# Patient Record
Sex: FEMALE | Employment: FULL TIME | URBAN - METROPOLITAN AREA
[De-identification: names, ages, dates, MRNs, and addresses within clinical notes are randomized per-mention and may not be internally consistent; named-entity substitution may affect disease eponyms.]

---

## 2017-03-06 ENCOUNTER — THERAPY VISIT (OUTPATIENT)
Dept: OCCUPATIONAL THERAPY | Facility: CLINIC | Age: 42
End: 2017-03-06
Payer: COMMERCIAL

## 2017-03-06 DIAGNOSIS — M25.521 RIGHT ELBOW PAIN: ICD-10-CM

## 2017-03-06 DIAGNOSIS — M77.11 LATERAL EPICONDYLITIS OF RIGHT ELBOW: Primary | ICD-10-CM

## 2017-03-06 PROCEDURE — 97110 THERAPEUTIC EXERCISES: CPT | Mod: GO | Performed by: OCCUPATIONAL THERAPIST

## 2017-03-06 PROCEDURE — 97165 OT EVAL LOW COMPLEX 30 MIN: CPT | Mod: GO | Performed by: OCCUPATIONAL THERAPIST

## 2017-03-06 PROCEDURE — 97140 MANUAL THERAPY 1/> REGIONS: CPT | Mod: GO | Performed by: OCCUPATIONAL THERAPIST

## 2017-03-06 PROCEDURE — 97035 APP MDLTY 1+ULTRASOUND EA 15: CPT | Mod: GO | Performed by: OCCUPATIONAL THERAPIST

## 2017-03-06 NOTE — MR AVS SNAPSHOT
"              After Visit Summary   3/6/2017    Nia Kim    MRN: [id number]           Patient Information     Date Of Birth          1975        Visit Information        Provider Department      3/6/2017 7:30 AM Abbie Segovia, SEBASTIEN [facility]        Today's Diagnoses     Lateral epicondylitis of right elbow    -  1    Right elbow pain           Follow-ups after your visit        Your next 10 appointments already scheduled     Mar 15, 2017  7:00 AM CDT   JHONNY Hand with Abbie Segovia OT   [facility] ([facility]    [address]  [address]  [place] [address] [phone]            Mar 22, 2017  7:30 AM CDT   JHONNY Hand with Abbie Segovia, SEBASTIEN   [facility] ([facility]    [address]  [address]  [place] [id number] [phone]              Who to contact     If you have questions or need follow up information about today's clinic visit or your schedule please contact [facility] directly at [phone].  Normal or non-critical lab and imaging results will be communicated to you by [employer], letter or phone within 4 business days after the clinic has received the results. If you do not hear from us within 7 days, please contact the clinic through [employer] or phone. If you have a critical or abnormal lab result, we will notify you by phone as soon as possible.  Submit refill requests through [employer] or call your pharmacy and they will forward the refill request to us. Please allow 3 business days for your refill to be completed.          Additional Information About Your Visit        MyChart Information     [employer] lets you send messages to your doctor, view your test results, renew your prescriptions, schedule appointments and more. To sign up, go to [email or web address] . Click on \"Log in\" on the left side of the screen, which will take you to the Welcome page. Then click on \"Sign up Now\" on the " right side of the page.     You will be asked to enter the access code listed below, as well as some personal information. Please follow the directions to create your username and password.     Your access code is: [email or web address]  Expires: 2017 12:17 PM     Your access code will  in 90 days. If you need help or a new code, please call your [facility] clinic or [phone].        Care EveryWhere ID     This is your Care EveryWhere ID. This could be used by other organizations to access your [facility] medical records  [id number]         Blood Pressure from Last 3 Encounters:   No data found for BP    Weight from Last 3 Encounters:   No data found for Wt              We Performed the Following     HC OT EVAL, LOW COMPLEXITY     JHONNY INITIAL EVAL REPORT     MANUAL THER TECH,1+REGIONS,EA 15 MIN     THERAPEUTIC EXERCISES     ULTRASOUND THERAPY        Primary Care Provider    None Specified       No primary provider on file.        Thank you!     Thank you for choosing [facility]  for your care. Our goal is always to provide you with excellent care. Hearing back from our patients is one way we can continue to improve our services. Please take a few minutes to complete the written survey that you may receive in the mail after your visit with us. Thank you!             Your Updated Medication List - Protect others around you: Learn how to safely use, store and throw away your medicines at www.disposemymeds.org.      Notice  As of 3/6/2017 12:17 PM    You have not been prescribed any medications.

## 2017-03-06 NOTE — LETTER
[facility]  [address]  [address]  [facility] [address]  [phone]    2017    Re: Nia Kim   :   1975  MRN:  [id number]   REFERRING PHYSICIAN:   Tracey Borden    [facility]  Date of Initial Evaluation: 17  Visits:  Rxs Used: 1  Reason for Referral:     Lateral epicondylitis of right elbow  Right elbow pain    EVALUATION SUMMARY    Hand Therapy Initial Evaluation  Current Date:  3/6/2017  Referring Physician: MARSHA Farias    Subjective:  Nia Kim is a 42 year old right hand dominant female.  Diagnosis:Right Lateral Epicondylitis  DOI:  17    Patient reports symptoms of pain, stiffness/loss of motion, weakness/loss of strength, numbness and tingling of the right hand and elbow which occurred due to repetitive motions at work. Since onset symptoms are gradually getting worse. Special tests:  none.  Previous treatment: arm band with minimal benefit, (B) OTC wrist supports for CTS symptoms. General health as reported by patient is fair.      Pertinent medical history includes:overweight, high blood pressure, depression , smoking  Medical allergies:none.  Surgical history: none.  Medication history: pain, muscle relaxants, anti-depressants, high blood pressure.    Current occupation is   Currently working in normal job without restrictions  Job Tasks: computer work, other writing  Barriers include:none  Prior functional level:  no limitations  Red flags:  pain at rest/night    Additional Occupational Profile Information (patterns of daily living, interests, values and needs): work, household tasks (cooking, cleaning), lifting, sleeping    Functional Outcome Measure:   Upper Extremity Functional Index Score:  SCORE:   Column Totals: /80: 56   (A lower score indicates greater disability.)    Objective:   Pain:     VAS(0-10) 3/6/17   At Rest:  410   With Use:  7/10     Report of Pain:  Location: right elbow and hand    Description: Ache, Sharp, Throbbing Radiates from hand into the elbow  Frequency: Constant  Duration:  Worse in pm.  Exacerbated by: Lifting, gripping, twisting, pulling, activity  Relieved by:  ice, rest   Progression:  gradually worsening    ROM:  Elbow 3/6/17 3/6/17   AROM Right Left   Extension -20 0   Flexion 100 130   Supination 65 70   Pronation 90 90     Wrist 3/6/17 3/6/17   PROM Right Left   Extension 70 75   Flexion 55 65     Resisted Testing   +,++,+++ = pain  Right 3/6/17   Elbow Flexion-sup +++   Elbow Flexion-neutral ++   Elbow Flexion-pron +++   Elbow Extension ++   Pronation +++   Supination +++   Wrist Ext +++   Wrist Ext with RD +++   Wrist Ext with UD +++   MCPJ Extension IF, MF +++   MCPJ Extension MF ++   MCPJ Extension 2-5 +            Elbow Ext 3/6/17   Trials Right Left   1  2  3  Av 70      3/6/17   Trials Right Left   1  2  3  Av  23  21  21 80  68  70  73     Palpation:  Right 3/6/17   Lateral Epicondyle +++   PIN +++   Radial Head +     Assessment:  Patient presents with symptoms consistent with diagnosis of Lateral Epicondylitis, with conservative intervention.     Patient's limitations or Problem List includes:  Pain, Decreased ROM/motion, Weakness, Decreased  and Tightness in musculature of the right elbow which interferes with the patient's ability to perform Work Tasks, Sleep Patterns and Household Chores as compared to previous level of function.    Rehab Potential:  Excellent - Return to full activity, no limitations    Patient will benefit from skilled Occupational Therapy to increase ROM, flexibility,  strength and forearm strength and decrease pain to return to previous activity level and resume normal daily tasks and to reach their rehab potential.    Barriers to Learning:  No barrier    Communication Issues:  Patient appears to be able to clearly communicate and understand verbal and written communication and follow directions  correctly.    Assessment of Occupational Performance:  1-3 Performance Deficits  Identified Performance Deficits: home establishment and management, sleep and work      Clinical Decision Making (Complexity): Low complexity    Treatment Explanation:  The following has been discussed with the patient:    RX ordered/plan of care  Anticipated outcomes  Possible risks and side effects    Plan:  Frequency:  1 X week, once daily  Duration:  for 6 visits    Treatment Plan:    Modalities:  US and Iontophoresis   Therapeutic Exercise: AROM of elbow and wrist, PROM with stretch to wrist extensors and flexors,  ECRL strengthening progressing to ECRB strengthening (eccentric progressing to concentric ).   Manual Techniques: Radial head mobilization, transverse friction massage, myofascial release   Neuromuscular:  Nerve gliding, K taping  Orthotic Fabrication:  Wrist cock up orthosis, arm band     Discharge Plan:    Achieve all LTG.  Independent in home treatment program.  Reach maximal therapeutic benefit.    Home Program:   Warmth for stiffness  Ice after activity for pain  PROM with stretch to wrist extensors and flexors  TFM to LEP  MFR to extensors  Wrist cock up orthosis for sleeping  Elbow strap/arm band as needed with activities  Avoid activities that exacerbate pain in the elbow  Lift with forearms in neutral position    Next Visit:  US  ECRL strengthening    Thank you for your referral.    INQUIRIES  Therapist: SAIGE Lynch/L, CHT  [facility]  [address]  [address]  [facility] [address]  Phone: [phone]  Fax: [phone]

## 2017-03-06 NOTE — PROGRESS NOTES
Hand Therapy Initial Evaluation    Current Date:  3/6/2017  Referring Physician: MARSHA Farias    Subjective:  Nia Kim is a 42 year old right hand dominant female.    Diagnosis:Right Lateral Epicondylitis  DOI:  1/20/17    Patient reports symptoms of pain, stiffness/loss of motion, weakness/loss of strength, numbness and tingling of the right hand and elbow which occurred due to repetitive motions at work. Since onset symptoms are gradually getting worse. Special tests:  none.  Previous treatment: arm band with minimal benefit, (B) OTC wrist supports for CTS symptoms    General health as reported by patient is fair.  Pertinent medical history includes:overweight, high blood pressure, depression , smoking  Medical allergies:none.  Surgical history: none.  Medication history: pain, muscle relaxants, anti-depressants, high blood pressure.  Current occupation is   Currently working in normal job without restrictions  Job Tasks: computer work, other writing  Barriers include:none  Prior functional level:  no limitations  Red flags:  pain at rest/night    Additional Occupational Profile Information (patterns of daily living, interests, values and needs): work, household tasks (cooking, cleaning), lifting, sleeping    Functional Outcome Measure:   Upper Extremity Functional Index Score:  SCORE:   Column Totals: /80: 56   (A lower score indicates greater disability.)    Objective:   Pain:     VAS(0-10) 3/6/17   At Rest:  4/10   With Use:  7/10     Report of Pain:  Location: right elbow and hand   Description: Ache, Sharp, Throbbing Radiates from hand into the elbow  Frequency: Constant  Duration:  Worse in pm.  Exacerbated by: Lifting, gripping, twisting, pulling, activity  Relieved by:  ice, rest   Progression:  gradually worsening    ROM:  Elbow 3/6/17 3/6/17   AROM Right Left   Extension -20 0   Flexion 100 130   Supination 65 70   Pronation 90 90       Wrist 3/6/17 3/6/17   PROM Right Left    Extension 70 75   Flexion 55 65     Resisted Testing   +,++,+++ = pain  Right 3/6/17   Elbow Flexion-sup +++   Elbow Flexion-neutral ++   Elbow Flexion-pron +++   Elbow Extension ++   Pronation +++   Supination +++   Wrist Ext +++   Wrist Ext with RD +++   Wrist Ext with UD +++   MCPJ Extension IF, MF +++   MCPJ Extension MF ++   MCPJ Extension 2-5 +      Elbow Ext 3/6/17   Trials Right Left   1  2  3  Av 70      3/6/17   Trials Right Left   1  2  3  Av  23  21  21 80  68  70  73     Palpation:  Right 3/6/17   Lateral Epicondyle +++   PIN +++   Radial Head +       Assessment:  Patient presents with symptoms consistent with diagnosis of Lateral Epicondylitis, with conservative intervention.     Patient's limitations or Problem List includes:  Pain, Decreased ROM/motion, Weakness, Decreased  and Tightness in musculature of the right elbow which interferes with the patient's ability to perform Work Tasks, Sleep Patterns and Household Chores as compared to previous level of function.    Rehab Potential:  Excellent - Return to full activity, no limitations    Patient will benefit from skilled Occupational Therapy to increase ROM, flexibility,  strength and forearm strength and decrease pain to return to previous activity level and resume normal daily tasks and to reach their rehab potential.    Barriers to Learning:  No barrier    Communication Issues:  Patient appears to be able to clearly communicate and understand verbal and written communication and follow directions correctly.    Assessment of Occupational Performance:  1-3 Performance Deficits  Identified Performance Deficits: home establishment and management, sleep and work      Clinical Decision Making (Complexity): Low complexity      Treatment Explanation:  The following has been discussed with the patient:    RX ordered/plan of care  Anticipated outcomes  Possible risks and side effects    Plan:  Frequency:  1 X week, once  daily  Duration:  for 6 visits    Treatment Plan:    Modalities:  US and Iontophoresis   Therapeutic Exercise: AROM of elbow and wrist, PROM with stretch to wrist extensors and flexors,  ECRL strengthening progressing to ECRB strengthening (eccentric progressing to concentric ).   Manual Techniques: Radial head mobilization, transverse friction massage, myofascial release   Neuromuscular:  Nerve gliding, K taping  Orthotic Fabrication:  Wrist cock up orthosis, arm band     Discharge Plan:    Achieve all LTG.  Independent in home treatment program.  Reach maximal therapeutic benefit.    Home Program:   Warmth for stiffness  Ice after activity for pain  PROM with stretch to wrist extensors and flexors  TFM to LEP  MFR to extensors  Wrist cock up orthosis for sleeping  Elbow strap/arm band as needed with activities  Avoid activities that exacerbate pain in the elbow  Lift with forearms in neutral position    Next Visit:  US  ECRL strengthening

## 2017-05-18 PROBLEM — M77.11 LATERAL EPICONDYLITIS OF RIGHT ELBOW: Status: RESOLVED | Noted: 2017-03-06 | Resolved: 2017-05-18

## 2017-05-18 PROBLEM — M25.521 RIGHT ELBOW PAIN: Status: RESOLVED | Noted: 2017-03-06 | Resolved: 2017-05-18

## 2022-04-28 ENCOUNTER — TRANSCRIBE ORDERS (OUTPATIENT)
Dept: OTHER | Age: 47
End: 2022-04-28
Payer: COMMERCIAL

## 2022-04-28 DIAGNOSIS — G56.22 ULNAR NERVE COMPRESSION, LEFT: ICD-10-CM

## 2022-04-28 DIAGNOSIS — G56.02 CARPAL TUNNEL SYNDROME OF LEFT WRIST: Primary | ICD-10-CM

## 2022-05-13 ENCOUNTER — THERAPY VISIT (OUTPATIENT)
Dept: OCCUPATIONAL THERAPY | Facility: CLINIC | Age: 47
End: 2022-05-13
Attending: PHYSICIAN ASSISTANT
Payer: COMMERCIAL

## 2022-05-13 DIAGNOSIS — G56.22 ENTRAPMENT OF LEFT ULNAR NERVE: ICD-10-CM

## 2022-05-13 DIAGNOSIS — G56.02 CARPAL TUNNEL SYNDROME OF LEFT WRIST: ICD-10-CM

## 2022-05-13 PROCEDURE — 97760 ORTHOTIC MGMT&TRAING 1ST ENC: CPT | Mod: GO

## 2022-05-13 PROCEDURE — 97110 THERAPEUTIC EXERCISES: CPT | Mod: GO

## 2022-05-13 PROCEDURE — 97165 OT EVAL LOW COMPLEX 30 MIN: CPT | Mod: GO

## 2022-05-13 NOTE — PROGRESS NOTES
Hand Therapy Initial Evaluation    Current Date:  5/13/2022  Referring Provider: Tracey Borden PA-C  Order Date: 4/28/2022    Diagnosis: Carpal tunnel syndrome of left wrist; Ulnar nerve compression, left    DOI: A few months    Subjective:  Nia Kim is a 47 year old female.    Patient reports symptoms of the left wrist which occurred due to overuse. Since onset symptoms are Gradually getting worse.  General health as reported by patient is poor.    Pertinent medical history includes:No past medical history on file.   Medical allergies:Not on File  Surgical history: No past surgical history on file.  Medication history:   No current outpatient medications on file.     No current facility-administered medications for this visit.     Current occupation is deliver pizzas  Job Tasks: Driving, Lifting, Carrying, Operating a Machine, Assembly, Prolonged Sitting, Prolonged Standing, Repetitive Tasks    Occupational Profile Information:  Right hand dominant  Prior functional level:  independent-shared household chores  Patient reports symptoms of pain, stiffness/loss of motion, weakness/loss of strength, edema, numbness and tingling   Special tests:  none.    Previous treatment: otc medication  Barriers include:none  Mobility: No difficulty  Transportation: drives  Currently working in normal job without restrictions  Leisure activities/hobbies: crafts, coloring, word puzzles    Functional Outcome Measure:   Upper Extremity Functional Index Score:  SCORE:   Column Totals: /80: 46   (A lower score indicates greater disability.)    Objective:  Pain Level (Scale 0-10):   5/13/2022   At Rest 1/10   With Use 10/10     Pain Description:  Date 5/13/2022   Location thumb, index finger and long finger, MEP   Pain Quality Aching, Dull, Sharp, Shooting, Tingling and itchy   Frequency constant     Pain is worst  daytime or nighttime   Exacerbated by  sleeping, with use   Relieved by otc medications   Progression Worsening      Posture  Normal    Edema  None    Sensation  Decreased Median Nerve distribution per pt report and Decreased Ulnar Nerve distribution per pt report    ROM  Pain Report: - none  + mild    ++ moderate    +++ severe   Wrist 5/13/2022 5/13/2022   AROM (PROM) R L   Extension 73 69   Flexion 53 27   RD 9 6   UD 35 25     Appearance  Observation:  - none  + mild    ++ moderate    +++ severe    5/13/2022   Clawing -   Hypothenar Atrophy -   Intrinsic Atrophy -     Special Tests  Pain Report: - none  + mild    ++ moderate    +++ severe    5/13/2022   Tinels at cubital tunnel -   Tinel's at guyon's canal +   Ulnar nerve subluxation at elbow -      5/13/2022   Median Nerve Compression at Pronator -   Carpal Compression Test--Durkan Test (30 sec) + @ 3 secs   Damon Test for Lumbrical Incursion  (fist x 30 secs) + @ 20 secs   Tinels at Carpal Tunnel +   Phalens + @ 5 secs      Neural Tension Testing  UNT: Ulnar Neurodynamic Test (based on DS Pozo's ULNT)   5/13/2022   0-5 Scale 2/5   Position:   0/5: Arm across abdomen in coronal plane  1/5: ER to neutral ABD shoulder to 45 degrees  2/5: ER shoulder to 90 degrees  3/5: Block shoulder and ABD shoulder to 110 degrees  4/5: Fully pronate forearm, extend wrist and ring and small fingers  5/5: Flex elbow and bring hand to side of face  Notes:    (+) indicates beyond grade level but less than nursing home to next level    (-) indicates over nursing home to level    S1  onset/change of patient's symptoms    S2 definite stop point based on patient's discomfort level    MNT: Median Neurodynamic Test (based on DS Pozo's ULNT)   5/13/2022   0-5 Scale 2/5   Position:   0/5: Arm across abdomen in coronal plane  1/5: Depress shoulder, ER to neutral ABD shoulder to 45 degrees  2/5: ER shoulder to end range, keep elbow at 90 degrees  3/5: Extend elbow to 0 degrees  4/5: Fully supinate forearm  5/5: Extend wrist, fingers and thumb  Notes:    (+) indicates beyond grade level but less than nursing home  to next level    (-) indicates over longterm to level    S1  onset/change of patient's symptoms    S2 definite stop point based on patient's discomfort level    Strength   (Measured in pounds)  Pain Report: - none  + mild    ++ moderate    +++ severe    5/13/2022 5/13/2022   Trials R L   1  2  3 62 44   Average 62 44     Lat Pinch 5/13/2022 5/13/2022   Trials R L   1  2  3 17 13   Average 17 13     3 Pt Pinch 5/13/2022 5/13/2022   Trials R L   1  2  3 11 11   Average 11 11     MMT Ulnar Nerve  Scale 0-5/5 5/13/2022   FCU 4/5, 4/10   FDP Ring and Small 5/5, 0/10   ADM 5/5, 0/10   ODM 4/5, 5/10   FDM 5/5, 0/10   Palmar Interossei 4/5, 0/10   Dorsal Interossei 4/5, 0/10   Lumbricals  5/5, 5/10   Adductor Pollicis 4/5, 5/10   FPB 4/5, 5/10     Assessment:  Patient presents with symptoms consistent with diagnosis of left Carpal Tunnel Syndrome and ulnar compression, with conservative intervention.     Patient's limitations or Problem List includes:  Pain, Decreased ROM/motion, Increased edema, Weakness, Sensory disturbance and Tightness in musculature of the left wrist which interferes with the patient's ability to perform Self Care Tasks (dressing, hygiene/toileting), Work Tasks, Sleep Patterns, Recreational Activities, Household Chores and Driving  as compared to previous level of function.    Rehab Potential:  Good - Return to full activity, some limitations    Patient will benefit from skilled Occupational Therapy to increase ROM, flexibility, motion, overall strength,  strength, pinch strength and sensation and decrease pain and edema to return to previous activity level and resume normal daily tasks and to reach their rehab potential.    Barriers to Learning:  No barrier    Communication Issues:  Patient appears to be able to clearly communicate and understand verbal and written communication and follow directions correctly.    Chart Review: Chart Review and Simple history review with patient    Identified  Performance Deficits: bathing/showering, dressing, hygiene and grooming, driving and community mobility, health management and maintenance, home establishment and management, meal preparation and cleanup, shopping, sleep, work and leisure activities    Assessment of Occupational Performance:  5 or more Performance Deficits    Clinical Decision Making (Complexity): Low complexity    Treatment Explanation:  The following has been discussed with the patient:  RX ordered/plan of care  Anticipated outcomes  Possible risks and side effects    Plan:  Frequency:  1 X week, once daily  Duration:  for 8 weeks    Treatment Plan:    Modalities:    US and Paraffin   Therapeutic Exercise:    AROM, AAROM, PROM, Tendon Gliding, Isotonics, Isometrics and Stabilization  Neuromuscular re-ed:   Nerve Gliding and Kinesiotaping  Manual Techniques:   Friction massage, Myofascial release and Manual edema mobilization  Orthotic Fabrication:    Static  Self Care:    Self Care Tasks, Ergonomic Considerations and Work Tasks    Discharge Plan:  Achieve all LTG.  Independent in home treatment program.  Reach maximal therapeutic benefit.    Home Exercise Program:  Carpal Tunnel Syndrome Prevention  EMR Notes  HEP - Sets  Reps  Sessions per day  Notes  Cubital Tunnel Prevention  EMR Notes  HEP - Sets  Reps  Sessions per day  Notes  Warmth  EMR Notes  HEP - Sets  Reps  Sessions per day Moist heat for 5 minutes before exercises  Notes  Ball Massage to Flexors  EMR Notes  HEP - Sets  Reps ball massage for 2 minutes prior to exercises  Sessions per day  Notes  Finger Active Range of Motion Tendon Glides Fist Series  Reps 10  Sessions per day 1-2  Hold each position 5 seconds  Finger Active Range of Motion FDS Flexor Tendon Gliding  Reps 10  Sessions per day 1-2  Nerve Gliding Proximal Median  Reps 10 (hold for 5 seconds)  Sessions per day 1-2  Nerve Gliding Proximal Ulnar  Reps 10 (hold for 5 seconds)  Sessions per day 1-2    Next Visit:  Review  HEP  Review orthosis fit  MFR  Nerve gliding  Paraffin/US

## 2022-05-13 NOTE — PROGRESS NOTES
[facility]    OUTPATIENT Occupational Therapy ORTHOPEDIC EVALUATION  PLAN OF TREATMENT FOR OUTPATIENT REHABILITATION  (COMPLETE FOR INITIAL CLAIMS ONLY)  Patient's Last Name, First Name, M.I.  YOB: 1975  Nia Kim    Provider s Name:  [facility]   Medical Record No.  [id number]   Start of Care Date:  05/13/22   Onset Date:   04/28/22 (Order Date)   Type:  OT Medical Diagnosis:    Encounter Diagnoses   Name Primary?    Carpal tunnel syndrome of left wrist     Entrapment of left ulnar nerve         Treatment Diagnosis:  L CTS & Ulnar Compression        Goals:     05/13/22 0500   Goal #1   Goal #1 household chores   Previous Performance Level Independent   Current Functional Task    Current Performance Level Maximum Difficulty   STG Target Perfomance Vacuum    STG Target Perform Level Moderate Difficulty   Due Date 06/10/22   LTG Target Task/Performance No Difficulty   Due Date 07/08/22       Therapy Frequency:  1x weekly  Predicted Duration of Therapy Intervention:  8 weeks    Ammy Anthony OT                 I CERTIFY THE NEED FOR THESE SERVICES FURNISHED UNDER        THIS PLAN OF TREATMENT AND WHILE UNDER MY CARE     (Physician attestation of this document indicates review and certification of the therapy plan).                     Certification Date From:  05/13/22   Certification Date To:  07/08/22    Referring Provider:  Tracey Borden    Initial Assessment        See Epic Evaluation SOC Date: 05/13/22

## 2022-05-18 ENCOUNTER — THERAPY VISIT (OUTPATIENT)
Dept: OCCUPATIONAL THERAPY | Facility: CLINIC | Age: 47
End: 2022-05-18
Payer: COMMERCIAL

## 2022-05-18 DIAGNOSIS — G56.22 ENTRAPMENT OF LEFT ULNAR NERVE: ICD-10-CM

## 2022-05-18 DIAGNOSIS — G56.02 CARPAL TUNNEL SYNDROME OF LEFT WRIST: Primary | ICD-10-CM

## 2022-05-18 PROCEDURE — 97112 NEUROMUSCULAR REEDUCATION: CPT | Mod: GO

## 2022-05-18 PROCEDURE — 97018 PARAFFIN BATH THERAPY: CPT | Mod: GO

## 2022-05-18 PROCEDURE — 97140 MANUAL THERAPY 1/> REGIONS: CPT | Mod: GO

## 2022-05-25 ENCOUNTER — THERAPY VISIT (OUTPATIENT)
Dept: OCCUPATIONAL THERAPY | Facility: CLINIC | Age: 47
End: 2022-05-25
Payer: COMMERCIAL

## 2022-05-25 DIAGNOSIS — G56.02 CARPAL TUNNEL SYNDROME OF LEFT WRIST: Primary | ICD-10-CM

## 2022-05-25 DIAGNOSIS — G56.22 ENTRAPMENT OF LEFT ULNAR NERVE: ICD-10-CM

## 2022-05-25 PROCEDURE — 97018 PARAFFIN BATH THERAPY: CPT | Mod: GO

## 2022-05-25 PROCEDURE — 97140 MANUAL THERAPY 1/> REGIONS: CPT | Mod: GO

## 2022-05-25 PROCEDURE — 97112 NEUROMUSCULAR REEDUCATION: CPT | Mod: GO

## 2022-07-06 NOTE — PROGRESS NOTES
Discharge Summary - Hand Therapy    Patient did not return to therapy.  Assume all goals were met to patient's satisfaction. D/C from hand therapy.   Epidermal Closure: simple interrupted